# Patient Record
Sex: FEMALE | Race: OTHER | Employment: FULL TIME | URBAN - METROPOLITAN AREA
[De-identification: names, ages, dates, MRNs, and addresses within clinical notes are randomized per-mention and may not be internally consistent; named-entity substitution may affect disease eponyms.]

---

## 2017-03-11 ENCOUNTER — HOSPITAL ENCOUNTER (EMERGENCY)
Age: 42
Discharge: HOME | End: 2017-03-11

## 2017-03-11 VITALS
BODY MASS INDEX: 35.44 KG/M2 | DIASTOLIC BLOOD PRESSURE: 86 MMHG | HEART RATE: 97 BPM | TEMPERATURE: 99.2 F | SYSTOLIC BLOOD PRESSURE: 141 MMHG | RESPIRATION RATE: 16 BRPM | OXYGEN SATURATION: 97 % | HEIGHT: 63 IN | WEIGHT: 200 LBS

## 2017-03-11 DIAGNOSIS — J04.0 LARYNGITIS: ICD-10-CM

## 2017-03-11 DIAGNOSIS — J40 BRONCHITIS: Primary | ICD-10-CM

## 2017-03-11 LAB
RAPID INFLUENZA  B AGN: NEGATIVE
RAPID INFLUENZA A AGN: NEGATIVE

## 2017-03-11 PROCEDURE — 99283 EMERGENCY DEPT VISIT LOW MDM: CPT

## 2017-03-11 PROCEDURE — 86403 PARTICLE AGGLUT ANTBDY SCRN: CPT

## 2017-03-11 RX ORDER — ALBUTEROL SULFATE 90 UG/1
AEROSOL, METERED RESPIRATORY (INHALATION)
Qty: 1 INHALER | Refills: 1 | Status: SHIPPED | OUTPATIENT
Start: 2017-03-11 | End: 2021-09-02 | Stop reason: ALTCHOICE

## 2017-03-11 RX ORDER — AZITHROMYCIN 250 MG/1
TABLET, FILM COATED ORAL
Qty: 6 TABLET | Refills: 0 | Status: SHIPPED | OUTPATIENT
Start: 2017-03-11 | End: 2017-03-21

## 2017-03-11 RX ORDER — BENZONATATE 100 MG/1
100 CAPSULE ORAL 3 TIMES DAILY PRN
Qty: 20 CAPSULE | Refills: 0 | Status: SHIPPED | OUTPATIENT
Start: 2017-03-11 | End: 2021-09-02 | Stop reason: ALTCHOICE

## 2017-03-11 RX ORDER — PREDNISONE 10 MG/1
50 TABLET ORAL DAILY
Qty: 25 TABLET | Refills: 0 | Status: SHIPPED | OUTPATIENT
Start: 2017-03-11 | End: 2017-03-16

## 2017-03-11 ASSESSMENT — ENCOUNTER SYMPTOMS
SHORTNESS OF BREATH: 0
COUGH: 1
SORE THROAT: 0
WHEEZING: 1
SINUS PRESSURE: 0
ABDOMINAL PAIN: 0
TROUBLE SWALLOWING: 0
BACK PAIN: 0

## 2021-08-25 ENCOUNTER — HOSPITAL ENCOUNTER (EMERGENCY)
Age: 46
Discharge: HOME | End: 2021-08-25
Attending: EMERGENCY MEDICINE
Payer: COMMERCIAL

## 2021-08-25 VITALS
OXYGEN SATURATION: 97 % | HEART RATE: 105 BPM | WEIGHT: 220 LBS | TEMPERATURE: 97.6 F | BODY MASS INDEX: 38.98 KG/M2 | SYSTOLIC BLOOD PRESSURE: 147 MMHG | DIASTOLIC BLOOD PRESSURE: 74 MMHG | RESPIRATION RATE: 18 BRPM | HEIGHT: 63 IN

## 2021-08-25 DIAGNOSIS — U07.1 COVID-19: Primary | ICD-10-CM

## 2021-08-25 LAB — SARS-COV-2, NAAT: DETECTED

## 2021-08-25 PROCEDURE — 87635 SARS-COV-2 COVID-19 AMP PRB: CPT

## 2021-08-25 PROCEDURE — 99283 EMERGENCY DEPT VISIT LOW MDM: CPT

## 2021-08-25 ASSESSMENT — ENCOUNTER SYMPTOMS
NAUSEA: 0
SORE THROAT: 0
SHORTNESS OF BREATH: 0
ABDOMINAL PAIN: 0
CHEST TIGHTNESS: 0
VOMITING: 0
EYE PAIN: 0

## 2021-08-25 NOTE — ED PROVIDER NOTES
[address] [facility] ED  EMERGENCY DEPARTMENT ENCOUNTER      Pt Name: Jina Hernández  MRN: [id number]  [place] 1975  Date of evaluation: 8/25/2021  Provider: Sydnie Hinton DO    CHIEF COMPLAINT       Chief Complaint   Patient presents with    Illness         HISTORY OF PRESENT ILLNESS   (Location/Symptom, Timing/Onset, Context/Setting, Quality, Duration, Modifying Factors, Severity)  Note limiting factors. Jina Hernández is a 41 y.o. female who presents to the emergency department . Patient is here with complaint of severe fatigue. Just very tired. Patient has been sick for at least 10 days. Tested positive today in the ER. No shortness of breath no nausea vomiting or diarrhea. Has missed a few days of work because she is so tired. She was not sure what she could do being positive and so tired if she should be going to work. HPI    Nursing Notes were reviewed. REVIEW OF SYSTEMS    (2-9 systems for level 4, 10 or more for level 5)     Review of Systems   Constitutional: Positive for fatigue. Negative for activity change and appetite change. HENT: Negative for congestion and sore throat. Eyes: Negative for pain and visual disturbance. Respiratory: Negative for chest tightness and shortness of breath. Cardiovascular: Negative for chest pain. Gastrointestinal: Negative for abdominal pain, nausea and vomiting. Endocrine: Negative for polydipsia. Genitourinary: Negative for flank pain and urgency. Musculoskeletal: Negative for gait problem and neck stiffness. Skin: Negative for rash. Neurological: Negative for weakness, light-headedness and headaches. Psychiatric/Behavioral: Negative for confusion and sleep disturbance. Except as noted above the remainder of the review of systems was reviewed and negative. PAST MEDICAL HISTORY   History reviewed. No pertinent past medical history.       SURGICAL HISTORY       Past Surgical History:   Procedure Laterality

## 2021-08-25 NOTE — ED NOTES
Pt denies any SOB. Pt states has a dry \"nasally cough\". Pt states that she started having symptoms 10 days ago.  Pt states that she does not have a head     Manuelito Johnson RN  08/25/21 0572

## 2021-08-26 ENCOUNTER — CARE COORDINATION (OUTPATIENT)
Dept: CARE COORDINATION | Age: 46
End: 2021-08-26

## 2021-08-26 NOTE — CARE COORDINATION
ACM CALLED PATIENT,LEFT MESSAGE REQUESTING RETURN CALL FOR ED FOLLOW UP COVID MONITORING.  CONTACT INFORMATION SUPPLIED

## 2021-08-27 ENCOUNTER — CARE COORDINATION (OUTPATIENT)
Dept: CARE COORDINATION | Age: 46
End: 2021-08-27

## 2021-08-27 NOTE — CARE COORDINATION
ACM CALLED CONTACT LISTED IN CHART.  MESSAGE LEFT REQUESTING RETURN CALL FOR ED FOLLOW UP. CONTACT SUPPLIED

## 2021-09-02 ENCOUNTER — VIRTUAL VISIT (OUTPATIENT)
Dept: FAMILY MEDICINE CLINIC | Age: 46
End: 2021-09-02
Payer: COMMERCIAL

## 2021-09-02 ENCOUNTER — TELEPHONE (OUTPATIENT)
Dept: FAMILY MEDICINE CLINIC | Age: 46
End: 2021-09-02

## 2021-09-02 DIAGNOSIS — Z13.1 SCREENING FOR DIABETES MELLITUS: ICD-10-CM

## 2021-09-02 DIAGNOSIS — Z13.220 ENCOUNTER FOR LIPID SCREENING FOR CARDIOVASCULAR DISEASE: ICD-10-CM

## 2021-09-02 DIAGNOSIS — Z76.89 ENCOUNTER TO ESTABLISH CARE: ICD-10-CM

## 2021-09-02 DIAGNOSIS — U07.1 COVID-19: Primary | ICD-10-CM

## 2021-09-02 DIAGNOSIS — Z13.6 ENCOUNTER FOR LIPID SCREENING FOR CARDIOVASCULAR DISEASE: ICD-10-CM

## 2021-09-02 PROCEDURE — 99422 OL DIG E/M SVC 11-20 MIN: CPT | Performed by: STUDENT IN AN ORGANIZED HEALTH CARE EDUCATION/TRAINING PROGRAM

## 2021-09-02 SDOH — ECONOMIC STABILITY: TRANSPORTATION INSECURITY
IN THE PAST 12 MONTHS, HAS LACK OF TRANSPORTATION KEPT YOU FROM MEETINGS, WORK, OR FROM GETTING THINGS NEEDED FOR DAILY LIVING?: NO

## 2021-09-02 SDOH — ECONOMIC STABILITY: FOOD INSECURITY: WITHIN THE PAST 12 MONTHS, THE FOOD YOU BOUGHT JUST DIDN'T LAST AND YOU DIDN'T HAVE MONEY TO GET MORE.: NEVER TRUE

## 2021-09-02 SDOH — ECONOMIC STABILITY: FOOD INSECURITY: WITHIN THE PAST 12 MONTHS, YOU WORRIED THAT YOUR FOOD WOULD RUN OUT BEFORE YOU GOT MONEY TO BUY MORE.: NEVER TRUE

## 2021-09-02 SDOH — ECONOMIC STABILITY: TRANSPORTATION INSECURITY
IN THE PAST 12 MONTHS, HAS THE LACK OF TRANSPORTATION KEPT YOU FROM MEDICAL APPOINTMENTS OR FROM GETTING MEDICATIONS?: NO

## 2021-09-02 ASSESSMENT — PATIENT HEALTH QUESTIONNAIRE - PHQ9
1. LITTLE INTEREST OR PLEASURE IN DOING THINGS: 0
SUM OF ALL RESPONSES TO PHQ9 QUESTIONS 1 & 2: 0
SUM OF ALL RESPONSES TO PHQ QUESTIONS 1-9: 0
2. FEELING DOWN, DEPRESSED OR HOPELESS: 0
SUM OF ALL RESPONSES TO PHQ QUESTIONS 1-9: 0
SUM OF ALL RESPONSES TO PHQ QUESTIONS 1-9: 0

## 2021-09-02 ASSESSMENT — ENCOUNTER SYMPTOMS
SHORTNESS OF BREATH: 1
WHEEZING: 0
VOMITING: 0
CHEST TIGHTNESS: 1
ABDOMINAL PAIN: 0
NAUSEA: 0
CONSTIPATION: 0
RHINORRHEA: 0
SINUS PRESSURE: 0
SORE THROAT: 0
COUGH: 1
DIARRHEA: 0
SINUS PAIN: 0

## 2021-09-02 ASSESSMENT — SOCIAL DETERMINANTS OF HEALTH (SDOH): HOW HARD IS IT FOR YOU TO PAY FOR THE VERY BASICS LIKE FOOD, HOUSING, MEDICAL CARE, AND HEATING?: NOT HARD AT ALL

## 2021-09-02 NOTE — PATIENT INSTRUCTIONS
Patient Education        COVID-19 Vaccine: Care Instructions  Overview     The COVID-19 vaccine can help you avoid getting COVID-19, a disease caused by a type of coronavirus. COVID-19 can cause pneumonia and even death. You may need 1 or 2 doses of the vaccine. And you might need \"booster\" doses later to help you stay protected. It takes two weeks after your last dose for the vaccine to protect you from COVID-19. The vaccine prevents most cases of COVID-19. But if you do still catch COVID-19, your symptoms will probably be less severe than if you hadn't gotten the vaccine. You can't get COVID-19 from the vaccine. What are the side effects of the COVID-19 vaccine? You might not have any side effects. But if you do, they'll probably be like side effects of other vaccines, including:  · Fever. · Soreness. · Feeling very tired. This is normal. Your body is building protection against COVID-19. You may also have other side effects, including:  · Chills. · Headache. · Pain, redness, or swelling in the arm where you had the vaccine. · Swollen lymph nodes in the armpit of the arm where you had the vaccine. Your side effects will likely go away in a few days. Until then, it may be harder to do things like work, school, or exercise. You may need 1 or 2 doses. If you get two doses, you may notice side effects more after the second dose. If you think you've been exposed to COVID-19 or have symptoms like a cough, trouble breathing, or a new loss of smell or taste, call your doctor. You might need a COVID test.  Follow-up care is a key part of your treatment and safety. Be sure to make and go to all appointments, and call your doctor if you are having problems. It's also a good idea to know your test results and keep a list of the medicines you take. How can you care for yourself at home?   · If you have a sore arm or a fever after getting the COVID-19 vaccine, you can take an over-the-counter pain medicine, such use of this information.

## 2021-09-02 NOTE — PROGRESS NOTES
patient discussing the diagnosis and importance of compliance with the treatment plan as well as documenting on the day of the visit. Bryan Tomas, was evaluated through a synchronous (real-time) audio-video encounter. The patient (or guardian if applicable) is aware that this is a billable service. Verbal consent to proceed has been obtained within the past 12 months. The visit was conducted pursuant to the emergency declaration under the [address] [place] [address] and the [employer] General Act. Patient identification was verified, and a caregiver was present when appropriate. The patient was located in a state where the provider was credentialed to provide care. An electronic signature was used to authenticate this note.     --Jimenez Mittal, DO

## 2021-09-02 NOTE — TELEPHONE ENCOUNTER
Patient is aware letter is ready LMOM-to return call with a fax number we can send it.  Patient is aware we do not email letters

## 2021-09-30 ENCOUNTER — TELEPHONE (OUTPATIENT)
Dept: FAMILY MEDICINE CLINIC | Age: 46
End: 2021-09-30

## 2021-09-30 ENCOUNTER — VIRTUAL VISIT (OUTPATIENT)
Dept: FAMILY MEDICINE CLINIC | Age: 46
End: 2021-09-30
Payer: COMMERCIAL

## 2021-09-30 DIAGNOSIS — Z86.16 HISTORY OF COVID-19: Primary | ICD-10-CM

## 2021-09-30 PROCEDURE — 99422 OL DIG E/M SVC 11-20 MIN: CPT | Performed by: STUDENT IN AN ORGANIZED HEALTH CARE EDUCATION/TRAINING PROGRAM

## 2021-09-30 ASSESSMENT — ENCOUNTER SYMPTOMS
ABDOMINAL PAIN: 0
NAUSEA: 0
VOMITING: 0
RHINORRHEA: 0
SHORTNESS OF BREATH: 1
COUGH: 0
CHEST TIGHTNESS: 0
DIARRHEA: 0
SORE THROAT: 0
EYE DISCHARGE: 0
WHEEZING: 0

## 2021-09-30 NOTE — PATIENT INSTRUCTIONS
Patient Education        COVID-19 Vaccine: Care Instructions  Overview     The COVID-19 vaccine can help you avoid getting COVID-19, a disease caused by a type of coronavirus. COVID-19 can cause pneumonia and even death. You may need 1 or 2 doses of the vaccine. And you might need \"booster\" doses later to help you stay protected. It takes two weeks after your last dose to be fully protected from COVID-19. The vaccine prevents most cases of COVID-19. But if you do still catch COVID-19, your symptoms will probably be less severe than if you hadn't gotten the vaccine. You can't get COVID-19 from the vaccine. What are the side effects of the COVID-19 vaccine? You might not have side effects. But if you do, they'll probably be like those of other vaccines, including:  · Fever. · Soreness. · Feeling very tired. This is normal. Your body is building protection against COVID-19. You may also have other side effects, including:  · Chills. · Headache. · Pain, redness, a rash, or swelling in the arm where you had the vaccine. · Swollen lymph nodes in the armpit of the arm where you had the vaccine. · Nausea. Side effects will likely go away in a few days. Until then, it may be harder to do your usual activities. You may need 1 or 2 doses. If you get 2 doses, you may notice side effects more after the second dose. If you think you've been exposed to COVID-19 or have symptoms like a cough, trouble breathing, or a new loss of smell or taste, call your doctor. These aren't vaccine side effects. You may need a COVID-19 test.  Follow-up care is a key part of your treatment and safety. Be sure to make and go to all appointments, and call your doctor if you are having problems. It's also a good idea to know your test results and keep a list of the medicines you take. How can you care for yourself at home?   · If you have a sore arm or a fever after getting the COVID-19 vaccine, you can take an over-the-counter pain medicine, such as acetaminophen (Tylenol) or ibuprofen (Advil, Motrin). Read and follow all instructions on the label. Do not give aspirin to anyone younger than 20. It has been linked to Reye syndrome, a serious illness. · Put ice or a cold pack on the sore area for 10 to 20 minutes at a time. Put a thin cloth between the ice and your skin. · If you have side effects, such as a fever, be sure to get enough rest and drink plenty of fluids. When should you call for help? Call 911  anytime you think you may need emergency care. For example, call if after getting the COVID-19 vaccine:    · You have symptoms of a severe reaction to the vaccine. Symptoms of a severe reaction may include:  ? Severe difficulty breathing. ? Sudden raised, red areas (hives) all over your body. ? Severe lightheadedness. Call your doctor now or seek immediate medical care if:    · You have one or more of these symptoms within several weeks of getting a COVID-19 vaccine. ? Severe headache that does not go away. ? Blurred vision. ? Shortness of breath. ? Chest pain, or a feeling of a fast-beating, fluttering, or pounding heart. ? Abdominal pain that does not go away. ? Pain, redness, or swelling in the leg.  ? Bruising or tiny spots under the skin that's not near where you got the vaccine. Watch closely for changes in your health, and be sure to contact your doctor if you have any problems. Where can you learn more? Go to https://[employer]. org and sign in to your [employer] account. Enter C126 in the [facility] box to learn more about \"COVID-19 Vaccine: Care Instructions. \"     If you do not have an account, please click on the \"Sign Up Now\" link. Current as of: July 1, 2021               Content Version: 13.0  © 2006-2021 [employer]. Care instructions adapted under license by [facility] ([facility]).  If you have questions about a medical condition or this instruction, always ask your healthcare professional. [address] any warranty or liability for your use of this information.